# Patient Record
Sex: FEMALE | Race: WHITE | NOT HISPANIC OR LATINO | Employment: FULL TIME | URBAN - METROPOLITAN AREA
[De-identification: names, ages, dates, MRNs, and addresses within clinical notes are randomized per-mention and may not be internally consistent; named-entity substitution may affect disease eponyms.]

---

## 2021-11-17 ENCOUNTER — TRANSFERRED RECORDS (OUTPATIENT)
Dept: HEALTH INFORMATION MANAGEMENT | Facility: CLINIC | Age: 56
End: 2021-11-17

## 2021-11-17 LAB — RETINOPATHY: NORMAL

## 2022-02-15 ENCOUNTER — TRANSFERRED RECORDS (OUTPATIENT)
Dept: HEALTH INFORMATION MANAGEMENT | Facility: CLINIC | Age: 57
End: 2022-02-15

## 2022-02-15 ENCOUNTER — MEDICAL CORRESPONDENCE (OUTPATIENT)
Dept: HEALTH INFORMATION MANAGEMENT | Facility: CLINIC | Age: 57
End: 2022-02-15
Payer: COMMERCIAL

## 2022-02-15 ENCOUNTER — TRANSCRIBE ORDERS (OUTPATIENT)
Dept: OTHER | Age: 57
End: 2022-02-15
Payer: COMMERCIAL

## 2022-02-15 DIAGNOSIS — H02.839 DERMATOCHALASIS OF UNSPECIFIED EYE, UNSPECIFIED EYELID: Primary | ICD-10-CM

## 2022-02-25 ENCOUNTER — TRANSFERRED RECORDS (OUTPATIENT)
Dept: HEALTH INFORMATION MANAGEMENT | Facility: CLINIC | Age: 57
End: 2022-02-25

## 2022-03-16 ENCOUNTER — OFFICE VISIT (OUTPATIENT)
Dept: OPHTHALMOLOGY | Facility: CLINIC | Age: 57
End: 2022-03-16
Payer: COMMERCIAL

## 2022-03-16 DIAGNOSIS — H53.10 SUBJECTIVE VISUAL DISTURBANCE OF BOTH EYES: ICD-10-CM

## 2022-03-16 DIAGNOSIS — H02.834 DERMATOCHALASIS OF BOTH UPPER EYELIDS: Primary | ICD-10-CM

## 2022-03-16 DIAGNOSIS — H02.403 INVOLUTIONAL PTOSIS, ACQUIRED, BILATERAL: ICD-10-CM

## 2022-03-16 DIAGNOSIS — H02.831 DERMATOCHALASIS OF BOTH UPPER EYELIDS: Primary | ICD-10-CM

## 2022-03-16 PROCEDURE — 92082 INTERMEDIATE VISUAL FIELD XM: CPT | Performed by: OPHTHALMOLOGY

## 2022-03-16 PROCEDURE — 99204 OFFICE O/P NEW MOD 45 MIN: CPT | Performed by: OPHTHALMOLOGY

## 2022-03-16 PROCEDURE — 92285 EXTERNAL OCULAR PHOTOGRAPHY: CPT | Performed by: OPHTHALMOLOGY

## 2022-03-16 RX ORDER — SOLRIAMFETOL 75 MG/1
1 TABLET, FILM COATED ORAL EVERY MORNING
COMMUNITY
Start: 2022-02-09 | End: 2022-10-12

## 2022-03-16 RX ORDER — BUSPIRONE HYDROCHLORIDE 10 MG/1
10 TABLET ORAL 2 TIMES DAILY
COMMUNITY
Start: 2021-12-23

## 2022-03-16 RX ORDER — PREGABALIN 75 MG/1
75 CAPSULE ORAL 3 TIMES DAILY
COMMUNITY
Start: 2021-12-16 | End: 2022-10-12

## 2022-03-16 RX ORDER — ALBUTEROL SULFATE 90 UG/1
2 AEROSOL, METERED RESPIRATORY (INHALATION) EVERY 4 HOURS PRN
COMMUNITY
Start: 2021-12-23

## 2022-03-16 RX ORDER — LOSARTAN POTASSIUM 50 MG/1
50 TABLET ORAL DAILY
COMMUNITY
Start: 2021-06-08

## 2022-03-16 RX ORDER — HYDROXYZINE PAMOATE 25 MG/1
25-50 CAPSULE ORAL 4 TIMES DAILY PRN
COMMUNITY
Start: 2021-11-18

## 2022-03-16 RX ORDER — AMLODIPINE BESYLATE 2.5 MG/1
2.5 TABLET ORAL DAILY
COMMUNITY
Start: 2021-05-05 | End: 2022-10-12

## 2022-03-16 RX ORDER — METFORMIN HCL 500 MG
1000 TABLET, EXTENDED RELEASE 24 HR ORAL 2 TIMES DAILY
COMMUNITY
Start: 2021-09-02

## 2022-03-16 RX ORDER — CELECOXIB 200 MG/1
1 CAPSULE ORAL DAILY
Status: ON HOLD | COMMUNITY
Start: 2022-02-25 | End: 2022-06-03

## 2022-03-16 RX ORDER — ESCITALOPRAM OXALATE 20 MG/1
20 TABLET ORAL DAILY
COMMUNITY
Start: 2021-05-05

## 2022-03-16 RX ORDER — TEMAZEPAM 15 MG/1
1 CAPSULE ORAL
COMMUNITY
Start: 2021-12-16 | End: 2022-10-12

## 2022-03-16 RX ORDER — LANSOPRAZOLE 30 MG/1
30 CAPSULE, DELAYED RELEASE ORAL DAILY
COMMUNITY
Start: 2021-10-07

## 2022-03-16 RX ORDER — AMOXICILLIN 500 MG/1
4 CAPSULE ORAL DAILY PRN
COMMUNITY
Start: 2021-11-30

## 2022-03-16 RX ORDER — ATORVASTATIN CALCIUM 20 MG/1
20 TABLET, FILM COATED ORAL DAILY
COMMUNITY
Start: 2022-02-03

## 2022-03-16 RX ORDER — GABAPENTIN 300 MG/1
600 CAPSULE ORAL AT BEDTIME
COMMUNITY
Start: 2022-01-25

## 2022-03-16 RX ORDER — METOPROLOL SUCCINATE 50 MG/1
50 TABLET, EXTENDED RELEASE ORAL DAILY
COMMUNITY
Start: 2021-12-23

## 2022-03-16 RX ORDER — FUROSEMIDE 40 MG
1 TABLET ORAL DAILY PRN
COMMUNITY
Start: 2021-12-23

## 2022-03-16 ASSESSMENT — TONOMETRY
OS_IOP_MMHG: 15
OD_IOP_MMHG: 14
IOP_METHOD: ICARE

## 2022-03-16 ASSESSMENT — SLIT LAMP EXAM - LIDS
COMMENTS: HEAVY DERMATOCHALASIS RESTING ON LASHES, TRUE PTOSIS
COMMENTS: HEAVY DERMATOCHALASIS RESTING ON LASHES, TRUE PTOSIS

## 2022-03-16 ASSESSMENT — VISUAL ACUITY
CORRECTION_TYPE: GLASSES
OD_CC+: -1
OS_CC: 20/20
OD_CC: 20/25
OS_CC+: -1
METHOD: SNELLEN - LINEAR

## 2022-03-16 ASSESSMENT — CONF VISUAL FIELD
OS_SUPERIOR_NASAL_RESTRICTION: 3
OD_SUPERIOR_TEMPORAL_RESTRICTION: 3
METHOD: COUNTING FINGERS
OS_SUPERIOR_TEMPORAL_RESTRICTION: 3

## 2022-03-16 ASSESSMENT — EXTERNAL EXAM - RIGHT EYE: OD_EXAM: NORMAL

## 2022-03-16 ASSESSMENT — EXTERNAL EXAM - LEFT EYE: OS_EXAM: BROW PTOSIS, WITH FRONTALIS RELAXED, BROW IS BELOW SUPERIOR ORBITAL RIM AND LATERALLY INLINE WITH UPPER LASHES

## 2022-03-16 NOTE — NURSING NOTE
Chief Complaints and History of Present Illnesses   Patient presents with     Dermatochalasis Evaluation     Chief Complaint(s) and History of Present Illness(es)     Dermatochalasis Evaluation     Laterality: right upper lid and left upper lid    Onset: chronic    Course: gradually worsening    Associated signs and symptoms: Negative for eye pain              Comments     Referred by Dr. Makenzie Rai for Dermatochalasis, BUL.  Complains of JAMES>RUL gradually becoming more droopy over the last few years.  Complains of lids getting in the way of her vision LE>RE.  Denies any eye pain.    Madhavi Gallegos OT 2:45 PM March 16, 2022

## 2022-03-16 NOTE — LETTER
3/16/2022         RE:  :  MRN: Mackenzie Garza  1965  [id number]     Dear Dr. Makenzie Rai,    Thank you for asking me to see your patient, Mackenzie Garza, for an oculoplastic   consultation.  My assessment and plan are below.  For further details, please see my attached clinic note.           HPI:     Chief Complaint(s) and History of Present Illness(es)     Dermatochalasis Evaluation     Laterality: right upper lid and left upper lid    Onset: chronic    Course: gradually worsening    Associated signs and symptoms: Negative for eye pain      Comments    Referred by Dr. Makenzie Rai for Dermatochalasis, BUL.  Complains of JAMES>RUL gradually becoming more droopy over the last few years.  Complains of lids getting in the way of her vision LE>RE.  Denies any eye pain.    Madhavi Gallegos OT 2:45 PM 2022         Mackenzie Garza is a 56 year old female who has noted gradual onset of droopy eyelids over the past years. The droopy eyelid is interfering with activities of daily living including driving, and reading. The patient denies double vision, variability of the eyelid position. She does have some dry eye symptoms and occasionally uses artificial tears     EXAM:     MRD1: 2 mm right eye and 1 mm left eye   Dermatochalasis with excess skin touching eyelashes   Brow ptosis with brow resting below superior orbital rim on the left  Aponeurotic ptosis     VISUAL FIELD:  Right eye untaped:5 degrees Right eye taped:.30 degrees  Left eye untaped:0 degrees Left eye taped:30 degrees    Assessment & Plan     Mackenzie Garza is a 56 year old female with the following diagnoses:   1. Dermatochalasis of both upper eyelids    2. Subjective visual disturbance of both eyes    3. Involutional ptosis, acquired, bilateral         Both upper eyelid blepharoplasty (S/O/N&CF)  and Bilateral ptosis repair external levator approach [id number]  BMI 50    Pointed out brow asymmetry  Discussed CPAP can cause more  postoperative edema, could try sleeping in a recliner without CPAP for a few days.    ANTICOAGULATION:    Aspirin 81 - hold         Again, thank you for allowing me to participate in the care of your patient.      Sincerely,    Maxx Gupta MD  Department of Ophthalmology and Visual Neurosciences  [facility]    CC: Makenzie Rai OD  [address] [address]  Via Fax: [phone]

## 2022-03-16 NOTE — PROGRESS NOTES
Oculoplastic Clinic New Patient    Patient: Mackenzie Garza MRN# [id number]   YOB: 1965 Age: 56 year old   Date of Visit: Mar 16, 2022    CC: Droopy eyelids obstructing vision.              HPI:     Chief Complaint(s) and History of Present Illness(es)     Dermatochalasis Evaluation     Laterality: right upper lid and left upper lid    Onset: chronic    Course: gradually worsening    Associated signs and symptoms: Negative for eye pain      Comments    Referred by Dr. Makenzie Rai for Dermatochalasis, BUL.  Complains of JAMES>RUL gradually becoming more droopy over the last few years.  Complains of lids getting in the way of her vision LE>RE.  Denies any eye pain.    Madhavi Gallegos OT 2:45 PM March 16, 2022         Mackenzie Garza is a 56 year old female who has noted gradual onset of droopy eyelids over the past years. The droopy eyelid is interfering with activities of daily living including driving, and reading. The patient denies double vision, variability of the eyelid position. She does have some dry eye symptoms and occasionally uses artificial tears     EXAM:     MRD1: 2 mm right eye and 1 mm left eye   Dermatochalasis with excess skin touching eyelashes   Brow ptosis with brow resting below superior orbital rim on the left  Aponeurotic ptosis     VISUAL FIELD:  Right eye untaped:5 degrees Right eye taped:.30 degrees  Left eye untaped:0 degrees Left eye taped:30 degrees    Assessment & Plan     Mackenzie Garza is a 56 year old female with the following diagnoses:   1. Dermatochalasis of both upper eyelids    2. Subjective visual disturbance of both eyes    3. Involutional ptosis, acquired, bilateral         Both upper eyelid blepharoplasty (S/O/N&CF)  and Bilateral ptosis repair external levator approach [id number]  BMI 50    Pointed out brow asymmetry  Discussed CPAP can cause more postoperative edema, could try sleeping in a recliner without CPAP for a few days.    ANTICOAGULATION:    Aspirin  81 - hold          PHOTOS DEMONSTRATE:    Significant dermatochalasis with lids resting on eyelashes and obstructing visual axis  Blepharoptosis  Brow ptosis with thicker brow skin and hairs below the lateral superior orbital rim on the left     Attending Physician Attestation: Complete documentation of historical and exam elements from today's encounter can be found in the full encounter summary report (not reduplicated in this progress note). I personally obtained the chief complaint(s) and history of present illness. I confirmed and edited as necessary the review of systems, past medical/surgical history, family history, social history, and examination findings as documented by others; and I examined the patient myself. I personally reviewed the relevant tests, images, and reports as documented above. I formulated and edited as necessary the assessment and plan and discussed the findings and management plan with the patient. Maxx Gupta MD    Today with Mackenzie Garza I reviewed the indications, risks, benefits, and alternatives of the proposed surgical procedure including, but not limited to, failure obtain the desired result  and need for additional surgery, bleeding, infection, loss of vision, loss of the eye, and the remote possibility of permanent damage to any organ system or death with the use of anesthesia.  I provided multiple opportunities for the questions, answered all questions to the best of my ability, and confirmed that my answers and my discussion were understood.

## 2022-04-19 ENCOUNTER — MEDICAL CORRESPONDENCE (OUTPATIENT)
Dept: HEALTH INFORMATION MANAGEMENT | Facility: CLINIC | Age: 57
End: 2022-04-19
Payer: COMMERCIAL

## 2022-04-19 ENCOUNTER — TRANSFERRED RECORDS (OUTPATIENT)
Dept: HEALTH INFORMATION MANAGEMENT | Facility: CLINIC | Age: 57
End: 2022-04-19
Payer: COMMERCIAL

## 2022-05-26 DIAGNOSIS — Z11.59 ENCOUNTER FOR SCREENING FOR OTHER VIRAL DISEASES: Primary | ICD-10-CM

## 2022-05-29 ENCOUNTER — HEALTH MAINTENANCE LETTER (OUTPATIENT)
Age: 57
End: 2022-05-29

## 2022-06-02 ENCOUNTER — ANESTHESIA EVENT (OUTPATIENT)
Dept: SURGERY | Facility: CLINIC | Age: 57
End: 2022-06-02
Payer: COMMERCIAL

## 2022-06-02 ASSESSMENT — LIFESTYLE VARIABLES: TOBACCO_USE: 1

## 2022-06-02 ASSESSMENT — COPD QUESTIONNAIRES: COPD: 1

## 2022-06-02 NOTE — ANESTHESIA PREPROCEDURE EVALUATION
Anesthesia Pre-Procedure Evaluation    Patient: Mackenzie Garza   MRN: [id number] : 1965        Procedure : Procedure(s):  Both upper eyelid blepharoplasty and ptosis repair          Past Medical History:   Diagnosis Date     Anxiety      Benign esophageal stricture      Calcification of left breast      Carotid stenosis      Cellulitis and abscess of right leg      COPD (chronic obstructive pulmonary disease) (H)      Coronary artery disease involving native coronary artery of native heart without angina pectoris      Depression      Diabetes (H)     Type 2     Endometrial polyp      Equinus contracture of ankle      Gastritis      HDL lipoprotein deficiency      History of mastoiditis      Hyperlipidemia      Hypertension      Insomnia, idiopathic      Mixed incontinence urge and stress (male)(female)      Nonrheumatic aortic (valve) insufficiency      Numbness and tingling of both feet      Obese      OCD (obsessive compulsive disorder)      Pes valgus      Polypharmacy      Posterior tibial tendon dysfunction, left      Postmenopausal bleeding      Recurrent AOM (acute otitis media)      Sleep apnea      Subaortic membrane      Tinea pedis of both feet      Tobacco dependency       Past Surgical History:   Procedure Laterality Date     CARPAL TUNNEL RELEASE RT/LT Bilateral      COLONOSCOPY       EGD       GYN SURGERY      hysteroscopy with D&C     knee replacement Left      PUBOVAGINAL SLING       REPLACEMENT TOTAL KNEE Right      revision total knee arthroplasty Right      revision total knee w/chronic drainage       talonavicular joint fusion, subtalar joint fusion, cotton osteotomy, gastrocnemius recession, posterior tibial tendon exporation and debridement and deltoid ligament reconstruction       tonsillectomy with uppp with septoplasty with bilateral turbinate reduction        Allergies   Allergen Reactions     Vancomycin      rash     Blood-Group Specific Substance      Patient has an anti-M  antibody.  Blood product orders may be delayed.  Draw one red top and two lavender top tubes for all Type and Screen/Type and Crossmatch orders.       Ciprofloxacin      Rash , patient on both cipro and vancomycin     Ceftriaxone Hives and Rash     itchy red generalized trunk rash each day x 2 Rocephin shots resolved with diphenhydramine (Benedryl)          Social History     Tobacco Use     Smoking status: Current Every Day Smoker     Packs/day: 0.50     Years: 35.00     Pack years: 17.50     Types: Cigarettes     Start date: 1/1/1984     Smokeless tobacco: Never Used   Substance Use Topics     Alcohol use: Not Currently      Wt Readings from Last 1 Encounters:   No data found for Wt        Anesthesia Evaluation            ROS/MED HX  ENT/Pulmonary:     (+) sleep apnea, tobacco use, Current use, COPD,     Neurologic:       Cardiovascular:     (+) hypertension-Peripheral Vascular Disease-- Carotid Stenosis. CAD ---valvular problems/murmurs type: AS Previous cardiac testing   Echo: Date: 5/2022 Results:   Final Conclusion Previous Study: 4-    Visually Estimated EF: 60-65%    Normal left ventricular size and ejection fraction. Visually estimated ejection fraction is   60-65%.    Left ventricular wall thickness mildly increased.    Mild to moderate left atrial dilation.    Trileaflet aortic valve. Moderate aortic valve calcification. Mild-to-moderate aortic   regurgitation.    Moderate-to-severe aortic stenosis. Mean transvalvular gradient is 44 mmHg. Calculated PRANEETH 1.1   cm2.     Stress Test: Date: Results:    ECG Reviewed: Date: Results:    Cath: Date: Results:      METS/Exercise Tolerance:     Hematologic:       Musculoskeletal:       GI/Hepatic: Comment: Esoph stricture - neg GI/hepatic ROS     Renal/Genitourinary:  - neg Renal ROS     Endo:     (+) type I DM, Using insulin, Obesity,     Psychiatric/Substance Use:     (+) psychiatric history anxiety and other (comment) (OCD)     Infectious Disease:        Malignancy:       Other:            Physical Exam    Airway        Mallampati: II   TM distance: > 3 FB   Neck ROM: full   Mouth opening: > 3 cm    Respiratory Devices and Support         Dental  no notable dental history         Cardiovascular   cardiovascular exam normal          Pulmonary   pulmonary exam normal                OUTSIDE LABS:  CBC: No results found for: WBC, HGB, HCT, PLT  BMP: No results found for: NA, POTASSIUM, CHLORIDE, CO2, BUN, CR, GLC  COAGS: No results found for: PTT, INR, FIBR  POC: No results found for: BGM, HCG, HCGS  HEPATIC: No results found for: ALBUMIN, PROTTOTAL, ALT, AST, GGT, ALKPHOS, BILITOTAL, BILIDIRECT, SHIREEN  OTHER: No results found for: PH, LACT, A1C, DEB, PHOS, MAG, LIPASE, AMYLASE, TSH, T4, T3, CRP, SED    Anesthesia Plan    ASA Status:  3   NPO Status:  NPO Appropriate    Anesthesia Type: MAC.     - Reason for MAC: straight local not clinically adequate              Consents    Anesthesia Plan(s) and associated risks, benefits, and realistic alternatives discussed. Questions answered and patient/representative(s) expressed understanding.    - Discussed:     - Discussed with:  Patient         Postoperative Care    Pain management: IV analgesics.   PONV prophylaxis: Ondansetron (or other 5HT-3)     Comments:                Endy Rodriguez, DO, DO

## 2022-06-03 ENCOUNTER — HOSPITAL ENCOUNTER (OUTPATIENT)
Facility: CLINIC | Age: 57
Discharge: HOME | End: 2022-06-03
Attending: OPHTHALMOLOGY | Admitting: OPHTHALMOLOGY
Payer: COMMERCIAL

## 2022-06-03 ENCOUNTER — ANESTHESIA (OUTPATIENT)
Dept: SURGERY | Facility: CLINIC | Age: 57
End: 2022-06-03
Payer: COMMERCIAL

## 2022-06-03 VITALS
BODY MASS INDEX: 51.51 KG/M2 | RESPIRATION RATE: 18 BRPM | DIASTOLIC BLOOD PRESSURE: 69 MMHG | WEIGHT: 290.7 LBS | SYSTOLIC BLOOD PRESSURE: 126 MMHG | TEMPERATURE: 96.9 F | HEIGHT: 63 IN | HEART RATE: 58 BPM | OXYGEN SATURATION: 95 %

## 2022-06-03 DIAGNOSIS — Z98.890 POSTOPERATIVE EYE STATE: Primary | ICD-10-CM

## 2022-06-03 LAB
FASTING STATUS PATIENT QL REPORTED: YES
GLUCOSE BLD-MCNC: 99 MG/DL (ref 70–99)
GLUCOSE BLDC GLUCOMTR-MCNC: 94 MG/DL (ref 70–99)
POTASSIUM BLD-SCNC: 4.4 MMOL/L (ref 3.4–5.3)

## 2022-06-03 PROCEDURE — 250N000011 HC RX IP 250 OP 636: Performed by: ANESTHESIOLOGY

## 2022-06-03 PROCEDURE — 360N000076 HC SURGERY LEVEL 3, PER MIN: Performed by: OPHTHALMOLOGY

## 2022-06-03 PROCEDURE — 82947 ASSAY GLUCOSE BLOOD QUANT: CPT | Performed by: ANESTHESIOLOGY

## 2022-06-03 PROCEDURE — 710N000009 HC RECOVERY PHASE 1, LEVEL 1, PER MIN: Performed by: OPHTHALMOLOGY

## 2022-06-03 PROCEDURE — 82962 GLUCOSE BLOOD TEST: CPT

## 2022-06-03 PROCEDURE — 250N000013 HC RX MED GY IP 250 OP 250 PS 637: Performed by: ANESTHESIOLOGY

## 2022-06-03 PROCEDURE — 999N000141 HC STATISTIC PRE-PROCEDURE NURSING ASSESSMENT: Performed by: OPHTHALMOLOGY

## 2022-06-03 PROCEDURE — 67904 REPAIR EYELID DEFECT: CPT | Mod: 50 | Performed by: OPHTHALMOLOGY

## 2022-06-03 PROCEDURE — 258N000003 HC RX IP 258 OP 636: Performed by: ANESTHESIOLOGY

## 2022-06-03 PROCEDURE — 710N000012 HC RECOVERY PHASE 2, PER MINUTE: Performed by: OPHTHALMOLOGY

## 2022-06-03 PROCEDURE — 250N000009 HC RX 250: Performed by: OPHTHALMOLOGY

## 2022-06-03 PROCEDURE — 272N000001 HC OR GENERAL SUPPLY STERILE: Performed by: OPHTHALMOLOGY

## 2022-06-03 PROCEDURE — 36415 COLL VENOUS BLD VENIPUNCTURE: CPT | Performed by: ANESTHESIOLOGY

## 2022-06-03 PROCEDURE — 370N000017 HC ANESTHESIA TECHNICAL FEE, PER MIN: Performed by: OPHTHALMOLOGY

## 2022-06-03 PROCEDURE — 84132 ASSAY OF SERUM POTASSIUM: CPT | Performed by: ANESTHESIOLOGY

## 2022-06-03 PROCEDURE — 250N000009 HC RX 250: Performed by: ANESTHESIOLOGY

## 2022-06-03 RX ORDER — HYDROMORPHONE HCL IN WATER/PF 6 MG/30 ML
0.2 PATIENT CONTROLLED ANALGESIA SYRINGE INTRAVENOUS EVERY 5 MIN PRN
Status: DISCONTINUED | OUTPATIENT
Start: 2022-06-03 | End: 2022-06-03 | Stop reason: HOSPADM

## 2022-06-03 RX ORDER — METOPROLOL SUCCINATE 50 MG/1
50 TABLET, EXTENDED RELEASE ORAL ONCE
Status: COMPLETED | OUTPATIENT
Start: 2022-06-03 | End: 2022-06-03

## 2022-06-03 RX ORDER — ONDANSETRON 2 MG/ML
4 INJECTION INTRAMUSCULAR; INTRAVENOUS EVERY 30 MIN PRN
Status: DISCONTINUED | OUTPATIENT
Start: 2022-06-03 | End: 2022-06-03 | Stop reason: HOSPADM

## 2022-06-03 RX ORDER — CELECOXIB 200 MG/1
200 CAPSULE ORAL DAILY
COMMUNITY
Start: 2022-06-07

## 2022-06-03 RX ORDER — ONDANSETRON 4 MG/1
4 TABLET, ORALLY DISINTEGRATING ORAL EVERY 30 MIN PRN
Status: DISCONTINUED | OUTPATIENT
Start: 2022-06-03 | End: 2022-06-03 | Stop reason: HOSPADM

## 2022-06-03 RX ORDER — OXYCODONE HYDROCHLORIDE 5 MG/1
5 TABLET ORAL EVERY 4 HOURS PRN
Status: DISCONTINUED | OUTPATIENT
Start: 2022-06-03 | End: 2022-06-03 | Stop reason: HOSPADM

## 2022-06-03 RX ORDER — PHENOL 1.4 %
10 AEROSOL, SPRAY (ML) MUCOUS MEMBRANE AT BEDTIME
COMMUNITY

## 2022-06-03 RX ORDER — FENTANYL CITRATE 0.05 MG/ML
25 INJECTION, SOLUTION INTRAMUSCULAR; INTRAVENOUS EVERY 5 MIN PRN
Status: DISCONTINUED | OUTPATIENT
Start: 2022-06-03 | End: 2022-06-03 | Stop reason: HOSPADM

## 2022-06-03 RX ORDER — DEXMEDETOMIDINE HYDROCHLORIDE 4 UG/ML
INJECTION, SOLUTION INTRAVENOUS PRN
Status: DISCONTINUED | OUTPATIENT
Start: 2022-06-03 | End: 2022-06-03

## 2022-06-03 RX ORDER — ERYTHROMYCIN 5 MG/G
OINTMENT OPHTHALMIC
Qty: 7 G | Refills: 0 | Status: SHIPPED | OUTPATIENT
Start: 2022-06-03 | End: 2022-07-13

## 2022-06-03 RX ORDER — PROPOFOL 10 MG/ML
INJECTION, EMULSION INTRAVENOUS CONTINUOUS PRN
Status: DISCONTINUED | OUTPATIENT
Start: 2022-06-03 | End: 2022-06-03

## 2022-06-03 RX ORDER — MEPERIDINE HYDROCHLORIDE 25 MG/ML
12.5 INJECTION INTRAMUSCULAR; INTRAVENOUS; SUBCUTANEOUS
Status: DISCONTINUED | OUTPATIENT
Start: 2022-06-03 | End: 2022-06-03 | Stop reason: HOSPADM

## 2022-06-03 RX ORDER — SODIUM CHLORIDE, SODIUM LACTATE, POTASSIUM CHLORIDE, CALCIUM CHLORIDE 600; 310; 30; 20 MG/100ML; MG/100ML; MG/100ML; MG/100ML
INJECTION, SOLUTION INTRAVENOUS CONTINUOUS PRN
Status: DISCONTINUED | OUTPATIENT
Start: 2022-06-03 | End: 2022-06-03

## 2022-06-03 RX ORDER — SODIUM CHLORIDE, SODIUM LACTATE, POTASSIUM CHLORIDE, CALCIUM CHLORIDE 600; 310; 30; 20 MG/100ML; MG/100ML; MG/100ML; MG/100ML
INJECTION, SOLUTION INTRAVENOUS CONTINUOUS
Status: DISCONTINUED | OUTPATIENT
Start: 2022-06-03 | End: 2022-06-03 | Stop reason: HOSPADM

## 2022-06-03 RX ORDER — ERYTHROMYCIN 5 MG/G
OINTMENT OPHTHALMIC PRN
Status: DISCONTINUED | OUTPATIENT
Start: 2022-06-03 | End: 2022-06-03 | Stop reason: HOSPADM

## 2022-06-03 RX ORDER — LIDOCAINE HYDROCHLORIDE 20 MG/ML
INJECTION, SOLUTION INFILTRATION; PERINEURAL PRN
Status: DISCONTINUED | OUTPATIENT
Start: 2022-06-03 | End: 2022-06-03

## 2022-06-03 RX ORDER — TETRACAINE HYDROCHLORIDE 5 MG/ML
SOLUTION OPHTHALMIC PRN
Status: DISCONTINUED | OUTPATIENT
Start: 2022-06-03 | End: 2022-06-03 | Stop reason: HOSPADM

## 2022-06-03 RX ORDER — FENTANYL CITRATE 0.05 MG/ML
25 INJECTION, SOLUTION INTRAMUSCULAR; INTRAVENOUS
Status: DISCONTINUED | OUTPATIENT
Start: 2022-06-03 | End: 2022-06-03 | Stop reason: HOSPADM

## 2022-06-03 RX ORDER — ONDANSETRON 2 MG/ML
INJECTION INTRAMUSCULAR; INTRAVENOUS PRN
Status: DISCONTINUED | OUTPATIENT
Start: 2022-06-03 | End: 2022-06-03

## 2022-06-03 RX ADMIN — ONDANSETRON 4 MG: 2 INJECTION INTRAMUSCULAR; INTRAVENOUS at 10:35

## 2022-06-03 RX ADMIN — DEXMEDETOMIDINE HYDROCHLORIDE 8 MCG: 200 INJECTION INTRAVENOUS at 10:41

## 2022-06-03 RX ADMIN — PROPOFOL 300 MCG/KG/MIN: 10 INJECTION, EMULSION INTRAVENOUS at 10:28

## 2022-06-03 RX ADMIN — SODIUM CHLORIDE, POTASSIUM CHLORIDE, SODIUM LACTATE AND CALCIUM CHLORIDE: 600; 310; 30; 20 INJECTION, SOLUTION INTRAVENOUS at 10:26

## 2022-06-03 RX ADMIN — METOPROLOL SUCCINATE 50 MG: 50 TABLET, EXTENDED RELEASE ORAL at 09:57

## 2022-06-03 RX ADMIN — LIDOCAINE HYDROCHLORIDE 40 MG: 20 INJECTION, SOLUTION INFILTRATION; PERINEURAL at 10:28

## 2022-06-03 RX ADMIN — DEXMEDETOMIDINE HYDROCHLORIDE 12 MCG: 200 INJECTION INTRAVENOUS at 10:47

## 2022-06-03 NOTE — OP NOTE
PREOPERATIVE DIAGNOSES:   1. Bilateral upper eyelid dermatochalasis.   2. Bilateral upper eyelid ptosis.     POSTOPERATIVE DIAGNOSES:   1. Bilateral upper eyelid dermatochalasis.   2. Bilateral upper eyelid ptosis.     PROCEDURE PERFORMED:   1. Bilateral upper eyelid blepharoplasty.   2. Bilateral upper eyelid ptosis repair by external levator resection.     SURGEON: Maxx Gupta MD, MD     ASSISTANT: Man Harrington MD    ANESTHESIA: Monitored with local infiltration, 50/50 mixture of 2% lidocaine with epinephrine and 0.5% Marcaine.     COMPLICATIONS: None.     ESTIMATED BLOOD LOSS: Less than 5 mL.     HISTORY: Mackenzie Garza  presented with upper lid drooping interfering with superior visual field and activities of daily living. After the risks, benefits and alternatives to the proposed procedure were explained, informed consent was obtained.     DESCRIPTION OF PROCEDURE: Mackenzie Garza  was brought to the operating room and placed supine on the operating table. IV sedation was given. The upper lid crease and excess upper eyelid skin was marked on each upper eyelid, and the eyelids infiltrated with local anesthetic. The area was prepped and draped in the typical sterile fashion for oculoplastic surgery. Attention was directed to the right side. Skin was incised following marked lines. Skin and orbicularis muscle flap were excised with cautery. Orbital septum was opened horizontally. The nasal and central fat pads were debulked with the high temperature cautery. Again, hemostasis was obtained. The orbital septum was opened horizontally and the levator aponeurosis identified. A rectangle was drawn with a marking pen, centered on the peak of the eyelid at the top of the tarsus. This was then excised with a Erika scissors. Hemostasis was obtained. The levator aponeurosis was then reapproximated using interrupted 6-0 Vicryl sutures. The  upper eyelid was closed with running 6-0 plain gut suture.  Attention was  directed to the left side where the same procedure was performed. Throughout the procedure the eyelid height and contour was checked and adjusted to ensure appropriate levator advancement.  Antibiotic ointment was applied. The patient tolerated the procedure well and was taken to the recovery room in stable condition.    Maxx Gupta MD

## 2022-06-03 NOTE — DISCHARGE INSTRUCTIONS
Post-operative Instructions  Ophthalmic Plastic and Reconstructive Surgery  Maxx Gupta M.D.     All instructions apply to BOTH operated EYELIDS and EYES.    Wound care and personal care  ? The CPAP can increase risk of your eyelid wound infection. Try to minimize CPAP for the 5 days after surgery. We recommend waiting until night of 06/08/2022 to resume routine use. Try to sleep upright in a recliner or use CPAP as least frequently as possible. If this is not possible, then please use your CPAP as you routinely use it.    ? Apply ice compresses 15 minutes of every hour while awake for 2 days. As long as there is no further bleeding, after two days, switch to warm water compresses for five minutes, four times a day until seen by your physician.   ? You may shower or wash your hair the day after surgery. Do not go swimming for at least 2 weeks to prevent contamination of your wounds.  ? You may go for walks and light activity is ok, but no heavy (over 15 pounds) lifting, bending or excessive straining for one week.   ? Do not apply make-up to the eyes or eyelids for 2 weeks after surgery.  ? Expect some swelling, bruising, black eye (even into the lower eyelids and cheeks). Also expect serum caking, crusting and discharge from the eye and/or incisions. A small amount of surface bleeding, and depending on the type of surgery, bleeding from the inside of the eyelid, is normal for the first 48 hours.  ? Avoid straining, bending at the waist, or lifting more than 15 pounds for 1 week. Sleeping with your head elevated, such as in a recliner, for the first several days can help swelling resolve more quickly.   ? Do continue to ambulate (walk) as you normally would - being sedentary after surgery can cause blood clots.   ? Your eye(s) and eyelid(s) may be painful and tender. This is normal after surgery.    Contact information and follow-up  ? Return to the Eye Clinic for a follow-up appointment with your physician as  scheduled. If no appointment has been scheduled:   - [facility]: [phone] for an appointment with Dr. Gupta within 2 to 3 weeks from your date of surgery.     ? For severe pain, bleeding, or loss of vision, call the [facility] at [phone].   After hours or on weekends and holidays, call [phone] and ask to speak with the ophthalmologist on call.    An on call person can be reached after hours for concerns. The on call doctor should not call in medication refill requests after hours or on weekends, so please plan accordingly. An effort has been made to provide adequate pain medications following every surgery, and refills will not be provided in most instances.     Medications  ? Restart all regular home medications and eye drops. For aspirin and Celebrex, WAIT for 72 hours after your surgery before restarting these in order to decrease the risk of bleeding complications.  ? In many cases, postoperative discomfort can be managed with Tylenol alone: 500 mg tablets ever 6 hours.   ? Avoid aspirin and aspirin-like medications (Celebrex, Motrin, Aleve, Ibuprofen, P[place] etc) for 72 hours to reduce the risk of bleeding.   ? In addition to your home medications, take the following post-operative medications as prescribed by your physician:  ? Apply antibiotic ointment (erythromycin) to all sutures three times a day, and into the operated eye(s) at night.     Same Day Surgery Discharge Instructions for  Sedation and General Anesthesia     It's not unusual to feel dizzy, light-headed or faint for up to 24 hours after surgery or while taking pain medication.  If you have these symptoms: sit for a few minutes before standing and have someone assist you when you get up to walk or use the bathroom.    You should rest and relax for the next 24 hours. We recommend you make arrangements to have an adult stay with you for at least 24 hours after your discharge.  Avoid  hazardous and strenuous activity.    DO NOT DRIVE any vehicle or operate mechanical equipment for 24 hours following the end of your surgery.  Even though you may feel normal, your reactions may be affected by the medication you have received.    Do not drink alcoholic beverages for 24 hours following surgery.     Slowly progress to your regular diet as you feel able. It's not unusual to feel nauseated and/or vomit after receiving anesthesia.  If you develop these symptoms, drink clear liquids (apple juice, ginger ale, broth, 7-up, etc. ) until you feel better.  If your nausea and vomiting persists for 24 hours, please notify your surgeon.      All narcotic pain medications, along with inactivity and anesthesia, can cause constipation. Drinking plenty of liquids and increasing fiber intake will help.    For any questions of a medical nature, call your surgeon.    Do not make important decisions for 24 hours.    If you had general anesthesia, you may have a sore throat for a couple of days related to the breathing tube used during surgery.  You may use Cepacol lozenges to help with this discomfort.  If it worsens or if you develop a fever, contact your surgeon.     If you feel your pain is not well managed with the pain medications prescribed by your surgeon, please contact your surgeon's office to let them know so they can address your concerns.       CoVid 19 Information    We want to give you information regarding Covid. Please consult your primary care provider with any questions you might have.     Patient who have symptoms (cough, fever, or shortness of breath), need to isolate for 7 days from when symptoms started OR 72 hours after fever resolves (without fever reducing medications) AND improvement of respiratory symptoms (whichever is longer).    Isolate yourself at home (in own room/own bathroom if possible)  Do Not allow any visitors  Do Not go to work or school  Do Not go to [employer],  centers,  shopping, or other public places.  Do Not shake hands.  Avoid close and intimate contact with others (hugging, kissing).  Follow CDC recommendations for household cleaning of frequently touched services.     After the initial 7 days, continue to isolate yourself from household members as much as possible. To continue decrease the risk of community spread and exposure, you and any members of your household should limit activities in public for 14 days after starting home isolation.     You can reference the following CDC link for helpful home isolation/care tips:  https://www.cdc.gov/coronavirus/2019-ncov/downloads/10Things.pdf    Protect Others:  Cover Your Mouth and Nose with a mask, disposable tissue or wash cloth to avoid spreading germs to others.  Wash your hands and face frequently with soap and water    Call Your Primary Doctor If: Breathing difficulty develops or you become worse.    For more information about COVID19 and options for caring for yourself at home, please visit the CDC website at https://www.cdc.gov/coronavirus/2019-ncov/about/steps-when-sick.html  For more options for care at [facility], please visit our website at [email or web address]          **If you have questions or concerns about your procedure,   call Dr. Gupta at [phone] [facility] and [phone] [facility]**

## 2022-06-03 NOTE — BRIEF OP NOTE
[facility]    Brief Operative Note    Pre-operative diagnosis: Dermatochalasis of both upper eyelids [H02.831, H02.834]  Involutional ptosis, acquired, bilateral [H02.403]  Post-operative diagnosis Same as pre-operative diagnosis    Procedure: Procedure(s):  Both upper eyelid blepharoplasty and ptosis repair  Surgeon: Surgeon(s) and Role:     * Maxx Gupta MD - Primary   Man Harrington MD - Resident  Anesthesia: Combined MAC with Local   Estimated Blood Loss: Minimal    Drains: None  Specimens: * No specimens in log *  Findings:   as expected.  Complications: None.  Implants: * No implants in log *

## 2022-06-03 NOTE — OR NURSING
Per Dr. Lilly do not wear cpap for 5 days, Ok'd per Anesthesia. Spoke with Dr. Rodriguez regarding last metoprolol dose yesterday morning. Order obtained for Metoprolol per PTA med list.

## 2022-06-03 NOTE — ANESTHESIA CARE TRANSFER NOTE
Patient: Mackenzie Garza    Procedure: Procedure(s):  Both upper eyelid blepharoplasty and ptosis repair       Diagnosis: Dermatochalasis of both upper eyelids [H02.831, H02.834]  Involutional ptosis, acquired, bilateral [H02.403]  Diagnosis Additional Information: No value filed.    Anesthesia Type:   MAC     Note:    Oropharynx: oropharynx clear of all foreign objects and spontaneously breathing  Level of Consciousness: awake  Oxygen Supplementation: nasal cannula  Level of Supplemental Oxygen (L/min / FiO2): 2  Independent Airway: airway patency satisfactory and stable  Dentition: dentition unchanged  Vital Signs Stable: post-procedure vital signs reviewed and stable  Report to RN Given: handoff report given  Patient transferred to: PACU  Comments: At end of procedure, spontaneous respirations, patient alert to voice, able to follow commands. Oxygen via nasal cannula at 2 liters per minute to PACU. Oxygen tubing connected to wall O2 in PACU, SpO2, NiBP, and EKG monitors and alarms on and functioning, Lissette Hugger warmer connected to patient gown, report on patient's clinical status given to PACU RN, RN questions answered.  Handoff Report: Identifed the Patient, Identified the Reponsible Provider, Reviewed the pertinent medical history, Discussed the surgical course, Reviewed Intra-OP anesthesia mangement and issues during anesthesia, Set expectations for post-procedure period and Allowed opportunity for questions and acknowledgement of understanding      Vitals:  Vitals Value Taken Time   /66 06/03/22 1113   Temp     Pulse 49 06/03/22 1115   Resp 12 06/03/22 1115   SpO2 97 % 06/03/22 1115   Vitals shown include unvalidated device data.    Electronically Signed By: Maribel Lynn  Laina 3, 2022  11:16 AM

## 2022-06-03 NOTE — ANESTHESIA POSTPROCEDURE EVALUATION
Patient: Mackenzie Garza    Procedure: Procedure(s):  Both upper eyelid blepharoplasty and ptosis repair       Anesthesia Type:  MAC    Note:  Disposition: Outpatient   Postop Pain Control: Uneventful            Sign Out: Well controlled pain   PONV: No   Neuro/Psych: Uneventful            Sign Out: Acceptable/Baseline neuro status   Airway/Respiratory: Uneventful            Sign Out: Acceptable/Baseline resp. status   CV/Hemodynamics: Uneventful            Sign Out: Acceptable CV status; No obvious hypovolemia; No obvious fluid overload   Other NRE: NONE   DID A NON-ROUTINE EVENT OCCUR? No           Last vitals:  Vitals Value Taken Time   /60 06/03/22 1145   Temp 36.1  C (96.9  F) 06/03/22 1145   Pulse 62 06/03/22 1150   Resp 15 06/03/22 1150   SpO2 94 % 06/03/22 1150   Vitals shown include unvalidated device data.    Electronically Signed By: Endy Rodriguez DO, DO  Laina 3, 2022  3:20 PM

## 2022-06-16 ENCOUNTER — APPOINTMENT (OUTPATIENT)
Dept: URGENT CARE | Facility: CLINIC | Age: 57
End: 2022-06-16
Payer: COMMERCIAL

## 2022-07-13 ENCOUNTER — OFFICE VISIT (OUTPATIENT)
Dept: OPHTHALMOLOGY | Facility: CLINIC | Age: 57
End: 2022-07-13
Payer: COMMERCIAL

## 2022-07-13 DIAGNOSIS — H02.831 DERMATOCHALASIS OF BOTH UPPER EYELIDS: Primary | ICD-10-CM

## 2022-07-13 DIAGNOSIS — H02.403 INVOLUTIONAL PTOSIS, ACQUIRED, BILATERAL: ICD-10-CM

## 2022-07-13 DIAGNOSIS — H02.834 DERMATOCHALASIS OF BOTH UPPER EYELIDS: Primary | ICD-10-CM

## 2022-07-13 PROCEDURE — 99024 POSTOP FOLLOW-UP VISIT: CPT | Performed by: OPHTHALMOLOGY

## 2022-07-13 ASSESSMENT — CONF VISUAL FIELD
OD_NORMAL: 1
OS_NORMAL: 1
METHOD: COUNTING FINGERS

## 2022-07-13 ASSESSMENT — TONOMETRY
OS_IOP_MMHG: 10
OD_IOP_MMHG: 10
IOP_METHOD: ICARE

## 2022-07-13 ASSESSMENT — VISUAL ACUITY
OS_CC+: -2
OD_CC+: -2
METHOD: SNELLEN - LINEAR
OD_CC: 20/25
CORRECTION_TYPE: GLASSES
OS_CC: 20/30

## 2022-07-13 NOTE — PROGRESS NOTES
Chief Complaint(s) and History of Present Illness(es)     Post Op (Ophthalmology) Both Eyes     Laterality: both eyes              Comments     S/P 1. Bilateral upper eyelid blepharoplasty 2. Bilateral upper eyelid   ptosis repair by external levator resection, 06/03/2022. Happy with   results, healing well. No concerns.          Doing well. Happy with outcome. Notices improvement in vision. F/u as needed.    There are no Patient Instructions on file for this visit.  No follow-ups on file.      Attending Physician Attestation: Complete documentation of historical and exam elements from today's encounter can be found in the full encounter summary report (not reduplicated in this progress note). I personally obtained the chief complaint(s) and history of present illness. I confirmed and edited as necessary the review of systems, past medical/surgical history, family history, social history, and examination findings as documented by others; and I examined the patient myself. I personally reviewed the relevant tests, images, and reports as documented above. I formulated and edited as necessary the assessment and plan and discussed the findings and management plan with the patient and family. I personally reviewed the ophthalmic test(s) associated with this encounter, agree with the interpretation(s) as documented by the resident/fellow, and have edited the corresponding report(s) as necessary. Maxx Gupta MD

## 2022-07-13 NOTE — NURSING NOTE
Chief Complaints and History of Present Illnesses   Patient presents with     Post Op (Ophthalmology) Both Eyes       Chief Complaint(s) and History of Present Illness(es)     Post Op (Ophthalmology) Both Eyes     Laterality: both eyes              Comments     S/P 1. Bilateral upper eyelid blepharoplasty 2. Bilateral upper eyelid ptosis repair by external levator resection, 06/03/2022. Happy with results, healing well. No concerns.                 Ryley Garcia, Ophthalmic Assistant

## 2022-08-11 ENCOUNTER — TRANSFERRED RECORDS (OUTPATIENT)
Dept: MULTI SPECIALTY CLINIC | Facility: CLINIC | Age: 57
End: 2022-08-11

## 2022-08-11 LAB — HBA1C MFR BLD: 6.1 % (ref 0–6.4)

## 2022-09-08 ENCOUNTER — PRE VISIT (OUTPATIENT)
Dept: NEUROLOGY | Facility: CLINIC | Age: 57
End: 2022-09-08

## 2022-09-14 ENCOUNTER — OFFICE VISIT (OUTPATIENT)
Dept: NEUROLOGY | Facility: CLINIC | Age: 57
End: 2022-09-14
Payer: COMMERCIAL

## 2022-09-14 VITALS
SYSTOLIC BLOOD PRESSURE: 132 MMHG | HEART RATE: 58 BPM | DIASTOLIC BLOOD PRESSURE: 83 MMHG | BODY MASS INDEX: 51 KG/M2 | OXYGEN SATURATION: 97 % | WEIGHT: 287.9 LBS | RESPIRATION RATE: 16 BRPM

## 2022-09-14 DIAGNOSIS — E66.01 MORBID OBESITY (H): ICD-10-CM

## 2022-09-14 DIAGNOSIS — G62.9 NEUROPATHY: Primary | ICD-10-CM

## 2022-09-14 PROCEDURE — 99205 OFFICE O/P NEW HI 60 MIN: CPT | Performed by: PSYCHIATRY & NEUROLOGY

## 2022-09-14 ASSESSMENT — PAIN SCALES - GENERAL: PAINLEVEL: NO PAIN (0)

## 2022-09-14 NOTE — PROGRESS NOTES
[facility] Neurology Consultation    Mackenzie Garza MRN# [id number]   Age: 56 year old YOB: 1965     Requesting physician: Mely Swift     Reason for Consultation: abnormal MRI brain      History of Presenting Symptoms:   Mackenzie Garza is a 56 year old female who presents today for evaluation of abnormal MRI brain.  The patient has a pertinent medical history of COPD, DMII, OCD, DIAZ, HLD, Depression/anxiety, and developed symptoms of numbness and tingling in her feet in 2019 after a surgery on her foot.    The patient was followed with [facility] of neurology, most recently seen 4/19/2022 for numbness in her feet and poor memory.  EMG in 2021 of her feet was normal.  MRI brain 2/2022 showed T2 signal changes on FLAIR most consistent with migraine/small vessel injury (reviewed by me today, I agree with findings). LP 3/2022 did not reveal oligoclonal bands, but did show elevated opening pressure (33).  Optometry evaluation was without signs of papilledema.  She as to coniser diamox for headache and continue whit lyrica for neuropathic pain.  A second opinion with an autoimmune clinic was recommended at either [facility] or [facility].    The patient confirms that she developed tingling and numbness in her left foot (medial and lateral surface, plantar and dorsal surface, up unto the ankle) one year after her surgery to create an arch.  She has been taking lyrica 150/150/150 as well as gabapentin 300/300/600 for relief of burning, and some numbness.  There is no weakness reported. There is no shooting pain that is ongoing. There is no spread of symptoms beyond initial region affected.  Symptoms can fluctuate, and often worse as the day goes on (becomes numb by end of day)     The patient feels that in the last year her memory is worse.  She has to stop in the middle of a conversation to recall a word, or may take some time within a conversation to recall what the topic was.  There is no  functional loss reported. She is driving without issue. 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 There is not a concern of memory or function by those around her in her life.     Social History:   Every day smoker (1/2 ppd, 35 years). No alcohol use at the moment. 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 High-school education.       Medications:   Amlodipine  ASA  Atorvastatin  Buspirone  Escitalopram  Furosemide  Gabapentin  Lyrica  hydroxizine  Losartan  Melatonin  Metoprolo;  Pregabalin  Temazepam     Physical Exam:   Vitals: /83   Pulse 58   Resp 16   Wt 130.6 kg (287 lb 14.4 oz)   SpO2 97%   BMI 51.00 kg/m     General: Seated comfortably in no acute distress.  HEENT: Neck supple with normal range of motion. No paracervical muscle tenderness or tightness.  Optic discs sharp and vasculature normal on funduscopic exam.   Musculoskeletal: laceration and incision scar on lateral portion of left foot    Neurologic:     Mental Status: Fully alert, attentive and oriented. Speech clear and fluent, no paraphasic errors. MiniCog score of 5/5. Cube copy without issue. Luria testing to three repeats.     Cranial Nerves: Visual fields intact. PERRL. EOMI with normal smooth pursuit. Facial sensation intact/symmetric. Facial movements symmetric. Hearing not formally tested but intact to conversation. Palate elevation symmetric, uvula midline. No dysarthria. Shoulder shrug strong bilaterally. Tongue protrusion midline.     Motor: No tremors or other abnormal movements observed. Muscle tone normal throughout. No pronator drift. Normal/symmetric rapid finger tapping. Strength 5/5 throughout upper and lower extremities.     Deep Tendon Reflexes: 2+/symmetric throughout upper and lower extremities. No clonus. Toes downgoing bilaterally.     Sensory: Intact/symmetric to light touch, pinprick, temperature, vibration and proprioception throughout upper and lower  extremities. Negative Romberg.      Coordination: Finger-nose-finger and heel-shin intact without dysmetria. Rapid alternating movements intact/symmetric with normal speed and rhythm.     Gait: Normal, steady casual gait. Able to walk on toes, heels and tandem without difficulty.         Data: Pertinent prior to visit   Procedures:  EMG 5/25/2021 for b/l foot tingling was reported as normal    Laboratory:  ESR, 7  Normal BMP  TSH 2.39  Vitamin D 44.8  Liver enzymes normal  Lyme normal  MATTIE normal  SPEP, immunofixation normal         Assessment and Plan:   Assessment:  Local left foot sensory nerve injury    The patient describes sensory deficits in her foot of numbness which worsen as the day goes on, but has no physical exam findings to indicate weakness or sensory loss to any specific modality.  She does have a few white matter lesions, but they are not necessarily consistent with her foot symptoms and given negative banding pattern on CSF,  I am doubtful they represent a demyelinating disease. Given the acuity of her symptoms following a surgery in that exact region, I would like to have her repeat her EMG to further support her prior EMG findings or to see if there are changes supportive for a minor/local sensory nerve injury. Otherwise, we spent considerable time discussing her memory concerns and normal screening testing today.  I suspect highly that medications are playing a role in her fluctuating cognition and recommended a MTM consultation.  We also discussed that she should not be on both gabapentin and lyrica, and to contact her PCP for further assistance as to which medication to discontinue.     Plan:  MTM  EMG LLE    Follow up in Neurology clinic in 4 months, or should new concerns arise.    JULIANA Urena D.O.   of Neurology      Total time  today (87 min) in this patient encounter was spent on pre-charting, counseling and/or coordination of care.  The patient is in agreement  with this plan and has no further questions.

## 2022-09-14 NOTE — NURSING NOTE
Chief Complaint   Patient presents with     Consult     NEW - review abnormal MRI findings     Danny Beaulieu

## 2022-09-14 NOTE — LETTER
9/14/2022       RE: Mackenzie Garza  [address] [place] [address]     Dear Colleague,    Thank you for referring your patient, Mackenzie Garza, to the [facility]UROLOG[facility] [place] [facility]. Please see a copy of my visit note below.    [facility] Neurology Consultation    Mackenzie Garza MRN# [id number]   Age: 56 year old YOB: 1965     Requesting physician: Mely Swift     Reason for Consultation: abnormal MRI brain      History of Presenting Symptoms:   Mackenzie Garza is a 56 year old female who presents today for evaluation of abnormal MRI brain.  The patient has a pertinent medical history of COPD, DMII, OCD, DIAZ, HLD, Depression/anxiety, and developed symptoms of numbness and tingling in her feet in 2019 after a surgery on her foot.    The patient was followed with [place] [facility] of neurology, most recently seen 4/19/2022 for numbness in her feet and poor memory.  EMG in 2021 of her feet was normal.  MRI brain 2/2022 showed T2 signal changes on FLAIR most consistent with migraine/small vessel injury (reviewed by me today, I agree with findings). LP 3/2022 did not reveal oligoclonal bands, but did show elevated opening pressure (33).  Optometry evaluation was without signs of papilledema.  She as to coniser diamox for headache and continue whit lyrica for neuropathic pain.  A second opinion with an autoimmune clinic was recommended at either [place] or [facility].    The patient confirms that she developed tingling and numbness in her left foot (medial and lateral surface, plantar and dorsal surface, up unto the ankle) one year after her surgery to create an arch.  She has been taking lyrica 150/150/150 as well as gabapentin 300/300/600 for relief of burning, and some numbness.  There is no weakness reported. There is no shooting pain that is ongoing. There is no spread of symptoms beyond initial region  affected.  Symptoms can fluctuate, and often worse as the day goes on (becomes numb by end of day)     The patient feels that in the last year her memory is worse.  She has to stop in the middle of a conversation to recall a word, or may take some time within a conversation to recall what the topic was.  There is no functional loss reported. She is driving without issue. 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 There is not a concern of memory or function by those around her in her life.     Social History:   Every day smoker (1/2 ppd, 35 years). No alcohol use at the moment. 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 High-school education.       Medications:   Amlodipine  ASA  Atorvastatin  Buspirone  Escitalopram  Furosemide  Gabapentin  Lyrica  hydroxizine  Losartan  Melatonin  Metoprolo;  Pregabalin  Temazepam     Physical Exam:   Vitals: /83   Pulse 58   Resp 16   Wt 130.6 kg (287 lb 14.4 oz)   SpO2 97%   BMI 51.00 kg/m     General: Seated comfortably in no acute distress.  HEENT: Neck supple with normal range of motion. No paracervical muscle tenderness or tightness.  Optic discs sharp and vasculature normal on funduscopic exam.   Musculoskeletal: laceration and incision scar on lateral portion of left foot    Neurologic:     Mental Status: Fully alert, attentive and oriented. Speech clear and fluent, no paraphasic errors. MiniCog score of 5/5. Cube copy without issue. Luria testing to three repeats.     Cranial Nerves: Visual fields intact. PERRL. EOMI with normal smooth pursuit. Facial sensation intact/symmetric. Facial movements symmetric. Hearing not formally tested but intact to conversation. Palate elevation symmetric, uvula midline. No dysarthria. Shoulder shrug strong bilaterally. Tongue protrusion midline.     Motor: No tremors or other abnormal movements observed. Muscle tone normal throughout. No pronator drift. Normal/symmetric  rapid finger tapping. Strength 5/5 throughout upper and lower extremities.     Deep Tendon Reflexes: 2+/symmetric throughout upper and lower extremities. No clonus. Toes downgoing bilaterally.     Sensory: Intact/symmetric to light touch, pinprick, temperature, vibration and proprioception throughout upper and lower extremities. Negative Romberg.      Coordination: Finger-nose-finger and heel-shin intact without dysmetria. Rapid alternating movements intact/symmetric with normal speed and rhythm.     Gait: Normal, steady casual gait. Able to walk on toes, heels and tandem without difficulty.         Data: Pertinent prior to visit   Procedures:  EMG 5/25/2021 for b/l foot tingling was reported as normal    Laboratory:  ESR, 7  Normal BMP  TSH 2.39  Vitamin D 44.8  Liver enzymes normal  Lyme normal  MATTIE normal  SPEP, immunofixation normal         Assessment and Plan:   Assessment:  Local left foot sensory nerve injury    The patient describes sensory deficits in her foot of numbness which worsen as the day goes on, but has no physical exam findings to indicate weakness or sensory loss to any specific modality.  She does have a few white matter lesions, but they are not necessarily consistent with her foot symptoms and given negative banding pattern on CSF,  I am doubtful they represent a demyelinating disease. Given the acuity of her symptoms following a surgery in that exact region, I would like to have her repeat her EMG to further support her prior EMG findings or to see if there are changes supportive for a minor/local sensory nerve injury. Otherwise, we spent considerable time discussing her memory concerns and normal screening testing today.  I suspect highly that medications are playing a role in her fluctuating cognition and recommended a MTM consultation.  We also discussed that she should not be on both gabapentin and lyrica, and to contact her PCP for further assistance as to which medication to  discontinue.     Plan:  MTM  EMG LLE    Follow up in Neurology clinic in 4 months, or should new concerns arise.      JULIANA Urena D.O.   of Neurology    Total time  today (87 min) in this patient encounter was spent on pre-charting, counseling and/or coordination of care.  The patient is in agreement with this plan and has no further questions.

## 2022-09-14 NOTE — PATIENT INSTRUCTIONS
I would like you to have an EMG of both legs, considering you have left foot sensory loss and right foot sensory loss to a milder degree.    I would like you consolidate your medications with a Pharmacist.

## 2022-09-16 ENCOUNTER — TELEPHONE (OUTPATIENT)
Dept: NEUROLOGY | Facility: CLINIC | Age: 57
End: 2022-09-16

## 2022-09-16 NOTE — TELEPHONE ENCOUNTER
MTM referral from: [facility] visit (referral by provider)    MTM referral outreach attempt #2 on September 16, 2022 at 10:55 AM      Outcome: Patient not reachable after several attempts, will route to [facility] Pharmacist/Provider as an FYI.  [facility] scheduling number is [phone].  Thank you for the referral.    Yuliana Craven [facility]

## 2022-10-03 ENCOUNTER — HEALTH MAINTENANCE LETTER (OUTPATIENT)
Age: 57
End: 2022-10-03

## 2022-10-12 ENCOUNTER — VIRTUAL VISIT (OUTPATIENT)
Dept: PHARMACY | Facility: CLINIC | Age: 57
End: 2022-10-12
Attending: PSYCHIATRY & NEUROLOGY
Payer: COMMERCIAL

## 2022-10-12 DIAGNOSIS — G62.9 NEUROPATHY: Primary | ICD-10-CM

## 2022-10-12 DIAGNOSIS — F17.200 TOBACCO USE DISORDER: ICD-10-CM

## 2022-10-12 DIAGNOSIS — K21.9 GASTROESOPHAGEAL REFLUX DISEASE, UNSPECIFIED WHETHER ESOPHAGITIS PRESENT: ICD-10-CM

## 2022-10-12 DIAGNOSIS — F41.9 ANXIETY: ICD-10-CM

## 2022-10-12 DIAGNOSIS — F42.9 OBSESSIVE-COMPULSIVE DISORDER, UNSPECIFIED TYPE: ICD-10-CM

## 2022-10-12 DIAGNOSIS — I10 ESSENTIAL HYPERTENSION: ICD-10-CM

## 2022-10-12 DIAGNOSIS — Z78.9 TAKES DIETARY SUPPLEMENTS: ICD-10-CM

## 2022-10-12 DIAGNOSIS — R52 PAIN: ICD-10-CM

## 2022-10-12 DIAGNOSIS — G47.00 INSOMNIA, UNSPECIFIED TYPE: ICD-10-CM

## 2022-10-12 DIAGNOSIS — G25.81 RLS (RESTLESS LEGS SYNDROME): ICD-10-CM

## 2022-10-12 DIAGNOSIS — E78.2 MIXED HYPERLIPIDEMIA: ICD-10-CM

## 2022-10-12 PROCEDURE — 99607 MTMS BY PHARM ADDL 15 MIN: CPT | Performed by: PHARMACIST

## 2022-10-12 PROCEDURE — 99605 MTMS BY PHARM NP 15 MIN: CPT | Performed by: PHARMACIST

## 2022-10-12 RX ORDER — ALBUTEROL SULFATE 0.83 MG/ML
2.5 SOLUTION RESPIRATORY (INHALATION) AT BEDTIME
COMMUNITY
Start: 2022-10-06

## 2022-10-12 NOTE — Clinical Note
LYLE-- patient's medications have been reviewed and multiple recommendations were given to her to discuss with her PCP

## 2022-10-12 NOTE — PROGRESS NOTES
Medication Therapy Management (MTM) Encounter    ASSESSMENT:                            Medication Adherence/Access: No issues identified    Neuropathy/pain/RLS: Patient is already taking maximum dose of pregabalin (Lyrica) so I would not recommend increasing beyond 600 mg/day. We did discuss that she could try spacing out the Lyrica to 1 capsule in the morning, 2 capsules at 3 pm, and 1 capsule about 1 hour before bed to see if it's more effective this way. Then could try reducing or going off the gabapentin. Additionally, since her only pain is in her foot and she is on Lyrica for this, I am not sure the celebrex is helpful and could potentially be stopped.    Anxiety/OCD: we discussed that her current mental ryan medications are not really targeted at treatment of OCD. She is not currently bothered by anxiety so I wonder if she could go off the Buspar and Lexapro and instead try Zoloft which has better evidence in treatment of OCD. She could also stop the hydroxyzine as it doesn't seem to be helpful and could contribute to daytime drowsiness.     Sleep: Medications may not be helpful for sleep maintenance. Instead, I recommended guided meditation and she could try using an ben.     Hypertension: appears stable     Hyperlipidemia: stable     GERD: Unclear if patient needs to continue on the PPI as she is not currently symptomatic. She was encouraged to discuss this with her PCP.     Type 2 Diabetes:  Patient would like to work on weight loss     COPD: Has been worse lately and she will be discussing with her PCP. I wonder if she needs addition of a steroid/long-acting beta agonist like Advair given her frequent use of albuterol. Smoking cessation will also be helpful.     Tobacco use disorder: bupropion would be a reasonable treatment to try for smoking cessation    Vitamins/supplements: stable     PLAN:                            1. We discussed that it would be helpful to get off the gabapentin, if possible. We  cannot increase Lyrica but we can separate the doses into 3 times/day which may be helpful. Please try taking the Lyrica with 1 capsule in the morning, 2 capsules at 3 pm, and 1 capsule about 1 hour before going to bed.     2. Try stopping the hydroxyzine as it doesn't seem this is helpful for sleep or anxiety and could add to your daytime drowsiness.     3. For sleep, we discussed trying guided meditations. Here are some apps I recommend:  -- Glad to Have You ben (www.headspace.com): 14 day free trial and then there is a monthly or annual rate  -- Calm ben (www.calm.com): 7 day free trial and then there is an annual rate  -- InsightTimer ben (www.insighttimer.com): FREE    4. It doesn't seem as though the Buspar or Lexapro are helpful for your OCD symptoms. Rather, I would suggest trying Zoloft and you may be able to get off of Buspar and Lexapro.    5. For smoking cessation, Wellbutrin would be a medication to consider trying.     6. It is unclear if you still need the lansoprazole for your stomach. Please discuss this with your primary care provider.      7. We discussed that celecoxib (Celebrex) is generally used for musculoskeletal pain rather than nerve pain, so I'm not sure it's very helpful for your foot pain. You could discuss with your doctor about trying off of this drug as well.    8. For your COPD, you may need to add another daily inhaler like Advair to reduce your shortness of breath and frequent use of albuterol. Please discuss this with your primary care provider.     Follow-up: 3-6 months or sooner if needed    SUBJECTIVE/OBJECTIVE:                          Mackenzie Garza is a 57 year old female called for an initial visit. She was referred to me from Dr. Urena.      Reason for visit: medication review  Reason for Referral:  atypical depression/OCD dosing, as well as dual therapies of lyrica and gabapentin.    Allergies/ADRs: Reviewed in chart  Past Medical History: Reviewed in chart  Tobacco: She  "reports that she has been smoking cigarettes. She started smoking about 38 years ago. She has a 17.50 pack-year smoking history. She has never used smokeless tobacco.Nicotine/Tobacco Cessation Plan:   discussed treatment options and she is interested in trying Wellbutrin-. 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   Alcohol: not currently using    Medication Adherence/Access: no issues reported    Neuropathy/pain/RLS: Currently taking pregabalin 150 mg, 2 capsules twice daily- morning and afternoon; gabapentin 300 mg, 2 capsules at bedtime; celecoxib 200 mg daily; and Tylenol 500 mg as needed- usually 2/day. The only pain she reportedly has is in her foot. Gabapentin is primarily for RLS. pregabalin has improved her nerve pain. States she has numbness, tingliness in the evening typically. She reports these medications may contribute to her drowsiness but she is not too dizzy. At times when she is driving she has to pull over because she is drowsy and will take a 15 minute nap.typically she wants to nap at about 2 pm. She would like to get off of gabapentin but is concerned her RLS/pain will flare up without it. Wondering if Lyrica can be increased.     Anxiety/OCD: Taking buspirone 10 mg twice daily and Lexapro 20 mg daily. Also has hydroxyzine pamoate 25 mg to take as needed. However, patient states she is not anxious- she only has OCD and is unsure if the medications have been helpful for this. She reportedly does not have depression. At times she loses her train of thought.     Sleep: taking melatonin 10 mg nightly. States she has no trouble falling asleep but has trouble staying asleep. when she can't sleep she will play a game on her phone and this helps her relax. 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 Of note, she used to take temazepam but has gone off of this recently. " "    Hypertension: Current medications include losartan 50 mg daily, metoprolol XL 50 mg daily (for heart murmur).  States she has been off amlodipine since May 2022. Has furosemide 40 mg as needed for swelling which she does not take daily. Patient reports no current medication side effects.  BP Readings from Last 3 Encounters:   09/14/22 132/83   06/03/22 126/69     Hyperlipidemia: Current therapy includes atorvastatin 20 mg daily.  Patient reports no significant myalgias or other side effects.        GERD: Current medications include: Prevacid (lansoprazole) 30 mg once daily. Patient states she had something \"like an ulcer\" on endoscopy. Does not have reflux or heartburn. Has been on the medication for a couple years.     Type 2 Diabetes:  Currently taking metformin 1000 mg twice daily. Patient is not experiencing side effects.states she knows she needs to lose weight. She is at 300 pounds currently.   Blood sugar monitoring: not currently   Aspirin: Taking 81mg daily for primary prevention   Statin: Yes: atorvastatin 20 mg daily    ACEi/ARB: Yes: losartan 50 mg daily.   Urine Albumin: No results found for: UMALCR   A1c: 6.1% on 8/11/22    COPD: Current medications: Incruse 1 puff daily (sometimes forgets) and using albuterol inhaler (using more frequently lately, especially when walking), multiple times daily. Using albuterol nebs at bedtime typically.     Patient is not experiencing side effects.   Patient reports the following symptoms: increased need of albuterol.  She has an upcoming appointment with her PCP.     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  States Chantix worked well the first time but future times was not helpful. Wondering if she could try Wellbutrin for smoking cessation.     Vitamins/supplements:   Fish oil 1000 mg daily   Vitamin D3 50 mcg daily   Vitamin E 180 mg daily "     Today's Vitals: There were no vitals taken for this visit.  ----------------    I spent 46 minutes with this patient today. I offer these suggestions for consideration by Dr. Urena. A copy of the visit note was provided to the patient's provider(s).    The patient was sent via [employer] a summary of these recommendations.     Maria R Lopez, Pharm.D.  Medication Therapy Management Pharmacist  [facility]    Telemedicine Visit Details  Type of service:  Telephone visit  Start Time: 11:03 AM  End Time: 11:49 AM  Originating Location (patient location): [place]  Distant Location (provider location):  [facility]     Medication Therapy Recommendations  Anxiety    Current Medication: hydrOXYzine (VISTARIL) 25 MG capsule   Rationale: Nonmedication therapy more appropriate - Unnecessary medication therapy - Indication   Recommendation: Discontinue Medication   Status: Patient Agreed - Adherence/Education         Neuropathy    Current Medication: pregabalin (LYRICA) 150 MG capsule   Rationale: Does not understand instructions - Adherence - Adherence   Recommendation: Change Administration Time   Status: Patient Agreed - Adherence/Education

## 2022-10-17 RX ORDER — PREGABALIN 150 MG/1
300 CAPSULE ORAL 2 TIMES DAILY
COMMUNITY

## 2022-10-18 NOTE — PATIENT INSTRUCTIONS
Recommendations from today's MTM visit:                                                    MTM (medication therapy management) is a service provided by a clinical pharmacist designed to help you get the most of out of your medicines.   Today we reviewed what your medicines are for, how to know if they are working, that your medicines are safe and how to make your medicine regimen as easy as possible.      1. We discussed that it would be helpful to get off the gabapentin, if possible. We cannot increase Lyrica but we can separate the doses into 3 times/day which may be helpful. Please try taking the Lyrica with 1 capsule in the morning, 2 capsules at 3 pm, and 1 capsule about 1 hour before going to bed.      2. Try stopping the hydroxyzine as it doesn't seem this is helpful for sleep or anxiety and could add to your daytime drowsiness.      3. For sleep, we discussed trying guided meditations. Here are some apps I recommend:  -- [employer] ben (www.headspace.com): 14 day free trial and then there is a monthly or annual rate  -- Calm ben (www.calm.com): 7 day free trial and then there is an annual rate  -- iTherXTimer ben (www.insighttimer.com): FREE    4. It doesn't seem as though the Buspar or Lexapro are helpful for your OCD symptoms. Rather, I would suggest trying Zoloft and you may be able to get off of Buspar and Lexapro.     5. For smoking cessation, Wellbutrin would be a medication to consider trying.      6. It is unclear if you still need the lansoprazole for your stomach. Please discuss this with your primary care provider.      7. We discussed that celecoxib (Celebrex) is generally used for musculoskeletal pain rather than nerve pain, so I'm not sure it's very helpful for your foot pain. You could discuss with your doctor about trying off of this drug as well.     8. For your COPD, you may need to add another daily inhaler like Advair to reduce your shortness of breath and frequent use of albuterol.  "Please discuss this with your primary care provider.      Follow-up: 3-6 months or sooner if needed    It was great speaking with you today.  I value your experience and would be very thankful for your time in providing feedback in our clinic survey. In the next few days, you may receive an email or text message from [email or web address] with a link to a survey related to your  clinical pharmacist.\"     To schedule another MTM appointment, please call the clinic directly or you may call the MTM scheduling line at [phone] or toll-free at [phone].     My Clinical Pharmacist's contact information:                                                      Please feel free to contact me with any questions or concerns you have.      Maria R Lopez, Pharm.D.  Medication Therapy Management Pharmacist  [facility]     "

## 2023-06-04 ENCOUNTER — HEALTH MAINTENANCE LETTER (OUTPATIENT)
Age: 58
End: 2023-06-04

## 2024-07-28 ENCOUNTER — HEALTH MAINTENANCE LETTER (OUTPATIENT)
Age: 59
End: 2024-07-28

## 2025-08-10 ENCOUNTER — HEALTH MAINTENANCE LETTER (OUTPATIENT)
Age: 60
End: 2025-08-10

## (undated) DEVICE — ESU PENCIL W/SMOKE EVAC CVPLP2000

## (undated) DEVICE — LINEN TOWEL PACK X5 5464

## (undated) DEVICE — SU VICRYL 6-0 S-14DA 18" J570G

## (undated) DEVICE — GLOVE PROTEXIS MICRO 6.0  2D73PM60

## (undated) DEVICE — SU PLAIN 6-0 G-1DA 18" 770G

## (undated) DEVICE — EYE PREP BETADINE 5% SOLUTION 30ML 0065-0411-30

## (undated) DEVICE — PACK OCULOPLATIC SEN15OCFSD

## (undated) DEVICE — SOL WATER IRRIG 1000ML BOTTLE 2F7114

## (undated) DEVICE — GLOVE PROTEXIS W/NEU-THERA 7.5  2D73TE75

## (undated) DEVICE — ESU EYE HIGH TEMP 65410-183

## (undated) RX ORDER — ONDANSETRON 2 MG/ML
INJECTION INTRAMUSCULAR; INTRAVENOUS
Status: DISPENSED
Start: 2022-06-03

## (undated) RX ORDER — PROPOFOL 10 MG/ML
INJECTION, EMULSION INTRAVENOUS
Status: DISPENSED
Start: 2022-06-03

## (undated) RX ORDER — METOPROLOL SUCCINATE 50 MG/1
TABLET, EXTENDED RELEASE ORAL
Status: DISPENSED
Start: 2022-06-03